# Patient Record
Sex: MALE | Race: WHITE | URBAN - METROPOLITAN AREA
[De-identification: names, ages, dates, MRNs, and addresses within clinical notes are randomized per-mention and may not be internally consistent; named-entity substitution may affect disease eponyms.]

---

## 2021-07-16 ENCOUNTER — EMERGENCY (EMERGENCY)
Facility: HOSPITAL | Age: 66
LOS: 0 days | Discharge: HOME | End: 2021-07-16
Attending: EMERGENCY MEDICINE | Admitting: EMERGENCY MEDICINE
Payer: MEDICARE

## 2021-07-16 VITALS
DIASTOLIC BLOOD PRESSURE: 83 MMHG | RESPIRATION RATE: 18 BRPM | HEIGHT: 66 IN | WEIGHT: 220.02 LBS | OXYGEN SATURATION: 94 % | HEART RATE: 106 BPM | TEMPERATURE: 101 F | SYSTOLIC BLOOD PRESSURE: 164 MMHG

## 2021-07-16 VITALS — HEART RATE: 88 BPM

## 2021-07-16 DIAGNOSIS — R50.9 FEVER, UNSPECIFIED: ICD-10-CM
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
## 2021-07-16 DIAGNOSIS — T50.B95A ADVERSE EFFECT OF OTHER VIRAL VACCINES, INITIAL ENCOUNTER: ICD-10-CM

## 2021-07-16 DIAGNOSIS — Z87.891 PERSONAL HISTORY OF NICOTINE DEPENDENCE: ICD-10-CM

## 2021-07-16 DIAGNOSIS — J44.9 CHRONIC OBSTRUCTIVE PULMONARY DISEASE, UNSPECIFIED: ICD-10-CM
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
## 2021-07-16 DIAGNOSIS — Z20.822 CONTACT WITH AND (SUSPECTED) EXPOSURE TO COVID-19: ICD-10-CM

## 2021-07-16 LAB — SARS-COV-2 RNA SPEC QL NAA+PROBE: SIGNIFICANT CHANGE UP

## 2021-07-16 PROCEDURE — 99284 EMERGENCY DEPT VISIT MOD MDM: CPT

## 2021-07-16 RX ORDER — ACETAMINOPHEN 500 MG
650 TABLET ORAL ONCE
Refills: 0 | Status: COMPLETED | OUTPATIENT
Start: 2021-07-16 | End: 2021-07-16

## 2021-07-16 RX ADMIN — Medication 650 MILLIGRAM(S): at 05:09

## 2021-07-16 NOTE — ED PROVIDER NOTE - NS ED ROS FT
Constitutional:  (-) fever, (+) chills, (-) lethargy  Eyes:  (-) eye pain (-) visual changes  ENMT: (-) nasal discharge, (-) sore throat. (-) neck pain or stiffness  Cardiac: (-) chest pain (-) palpitations  Respiratory:  (-) cough (-) respiratory distress.   GI:  (-) nausea (-) vomiting (-) diarrhea (-) abdominal pain.  :  (-) dysuria (-) frequency (-) burning.  MS:  (-) back pain (-) joint pain.  Neuro:  (-) headache (-) numbness (-) tingling (-) focal weakness  Skin:  (-) rash  Except as documented in the HPI,  all other systems are negative

## 2021-07-16 NOTE — ED PROVIDER NOTE - OBJECTIVE STATEMENT
67 yo M with PMH of COPD on 2L home O2 presenting for chills. Patient had second pfizer vaccine yesterday and says he has felt very cold since. Has not taken temperature at home. Denies any associated symptoms including headache, vision changes, dizziness, cough, congestion, sore throat, chest pain, shortness of breath, nausea, vomiting, diarrhea, dysuria, hematuria, melena, hematochezia.

## 2021-07-16 NOTE — ED PROVIDER NOTE - PATIENT PORTAL LINK FT
You can access the FollowMyHealth Patient Portal offered by [facility] by registering at the following website: [email or web address]. By joining [employer]’s FollowMyHealth portal, you will also be able to view your health information using other applications (apps) compatible with our system.

## 2021-07-16 NOTE — ED PROVIDER NOTE - NSFOLLOWUPINSTRUCTIONS_ED_ALL_ED_FT
Take tylenol every 6 horus for fever. Drink plenty of fluids. Follow up with your docotr. Return to ED for chest pain, trouble breathing, or new concerns.

## 2021-07-16 NOTE — ED PROVIDER NOTE - ATTENDING CONTRIBUTION TO CARE
vaccine side effect ambulating about ED without difficulty will d/c supportive care. Patient counseled regarding conditions which should prompt return.

## 2022-11-09 NOTE — ED ADULT NURSE NOTE - NS ED NURSE RECORD ANOTHER HT AND WT
Graft Donor Site Bandage (Optional-Leave Blank If You Don't Want In Note): Steri-strips and a pressure bandage were applied to the donor site. Yes

## 2024-03-13 ENCOUNTER — EMERGENCY (EMERGENCY)
Facility: HOSPITAL | Age: 69
LOS: 0 days | Discharge: LEFT BEFORE DISCHARGE | End: 2024-03-13
Attending: EMERGENCY MEDICINE
Payer: MEDICARE

## 2024-03-13 VITALS
WEIGHT: 235.01 LBS | TEMPERATURE: 98 F | DIASTOLIC BLOOD PRESSURE: 66 MMHG | SYSTOLIC BLOOD PRESSURE: 145 MMHG | RESPIRATION RATE: 24 BRPM | HEART RATE: 80 BPM | OXYGEN SATURATION: 89 %

## 2024-03-13 DIAGNOSIS — R14.0 ABDOMINAL DISTENSION (GASEOUS): ICD-10-CM

## 2024-03-13 DIAGNOSIS — R05.8 OTHER SPECIFIED COUGH: ICD-10-CM

## 2024-03-13 DIAGNOSIS — E78.5 HYPERLIPIDEMIA, UNSPECIFIED: ICD-10-CM

## 2024-03-13 DIAGNOSIS — Z20.822 CONTACT WITH AND (SUSPECTED) EXPOSURE TO COVID-19: ICD-10-CM

## 2024-03-13 DIAGNOSIS — R10.9 UNSPECIFIED ABDOMINAL PAIN: ICD-10-CM

## 2024-03-13 DIAGNOSIS — Z53.29 PROCEDURE AND TREATMENT NOT CARRIED OUT BECAUSE OF PATIENT'S DECISION FOR OTHER REASONS: ICD-10-CM

## 2024-03-13 DIAGNOSIS — R09.02 HYPOXEMIA: ICD-10-CM

## 2024-03-13 DIAGNOSIS — Z87.891 PERSONAL HISTORY OF NICOTINE DEPENDENCE: ICD-10-CM

## 2024-03-13 DIAGNOSIS — J44.9 CHRONIC OBSTRUCTIVE PULMONARY DISEASE, UNSPECIFIED: ICD-10-CM

## 2024-03-13 DIAGNOSIS — I10 ESSENTIAL (PRIMARY) HYPERTENSION: ICD-10-CM

## 2024-03-13 DIAGNOSIS — R06.02 SHORTNESS OF BREATH: ICD-10-CM

## 2024-03-13 DIAGNOSIS — R06.00 DYSPNEA, UNSPECIFIED: ICD-10-CM

## 2024-03-13 DIAGNOSIS — R06.2 WHEEZING: ICD-10-CM

## 2024-03-13 DIAGNOSIS — Z99.81 DEPENDENCE ON SUPPLEMENTAL OXYGEN: ICD-10-CM

## 2024-03-13 LAB
ALBUMIN SERPL ELPH-MCNC: 3.8 G/DL — SIGNIFICANT CHANGE UP (ref 3.5–5.2)
ALP SERPL-CCNC: 58 U/L — SIGNIFICANT CHANGE UP (ref 30–115)
ALT FLD-CCNC: 15 U/L — SIGNIFICANT CHANGE UP (ref 0–41)
ANION GAP SERPL CALC-SCNC: 5 MMOL/L — LOW (ref 7–14)
AST SERPL-CCNC: 16 U/L — SIGNIFICANT CHANGE UP (ref 0–41)
BASE EXCESS BLDV CALC-SCNC: 15.1 MMOL/L — HIGH (ref -2–3)
BASOPHILS # BLD AUTO: 0.06 K/UL — SIGNIFICANT CHANGE UP (ref 0–0.2)
BASOPHILS NFR BLD AUTO: 0.6 % — SIGNIFICANT CHANGE UP (ref 0–1)
BILIRUB SERPL-MCNC: 0.3 MG/DL — SIGNIFICANT CHANGE UP (ref 0.2–1.2)
BUN SERPL-MCNC: 18 MG/DL — SIGNIFICANT CHANGE UP (ref 10–20)
CA-I SERPL-SCNC: 1.15 MMOL/L — SIGNIFICANT CHANGE UP (ref 1.15–1.33)
CALCIUM SERPL-MCNC: 9.1 MG/DL — SIGNIFICANT CHANGE UP (ref 8.4–10.5)
CHLORIDE SERPL-SCNC: 96 MMOL/L — LOW (ref 98–110)
CO2 SERPL-SCNC: 38 MMOL/L — HIGH (ref 17–32)
CREAT SERPL-MCNC: 0.8 MG/DL — SIGNIFICANT CHANGE UP (ref 0.7–1.5)
EGFR: 95 ML/MIN/1.73M2 — SIGNIFICANT CHANGE UP
EOSINOPHIL # BLD AUTO: 0.69 K/UL — SIGNIFICANT CHANGE UP (ref 0–0.7)
EOSINOPHIL NFR BLD AUTO: 6.6 % — SIGNIFICANT CHANGE UP (ref 0–8)
FLUAV AG NPH QL: SIGNIFICANT CHANGE UP
FLUBV AG NPH QL: SIGNIFICANT CHANGE UP
GAS PNL BLDV: 138 MMOL/L — SIGNIFICANT CHANGE UP (ref 136–145)
GAS PNL BLDV: SIGNIFICANT CHANGE UP
GLUCOSE SERPL-MCNC: 109 MG/DL — HIGH (ref 70–99)
HCO3 BLDV-SCNC: 44 MMOL/L — HIGH (ref 22–29)
HCT VFR BLD CALC: 41.7 % — LOW (ref 42–52)
HCT VFR BLDA CALC: 44 % — SIGNIFICANT CHANGE UP (ref 39–51)
HGB BLD CALC-MCNC: 14.8 G/DL — SIGNIFICANT CHANGE UP (ref 12.6–17.4)
HGB BLD-MCNC: 14.4 G/DL — SIGNIFICANT CHANGE UP (ref 14–18)
IMM GRANULOCYTES NFR BLD AUTO: 0.5 % — HIGH (ref 0.1–0.3)
LACTATE BLDV-MCNC: 1.4 MMOL/L — SIGNIFICANT CHANGE UP (ref 0.5–2)
LYMPHOCYTES # BLD AUTO: 2.4 K/UL — SIGNIFICANT CHANGE UP (ref 1.2–3.4)
LYMPHOCYTES # BLD AUTO: 22.8 % — SIGNIFICANT CHANGE UP (ref 20.5–51.1)
MCHC RBC-ENTMCNC: 30.3 PG — SIGNIFICANT CHANGE UP (ref 27–31)
MCHC RBC-ENTMCNC: 34.5 G/DL — SIGNIFICANT CHANGE UP (ref 32–37)
MCV RBC AUTO: 87.8 FL — SIGNIFICANT CHANGE UP (ref 80–94)
MONOCYTES # BLD AUTO: 1.02 K/UL — HIGH (ref 0.1–0.6)
MONOCYTES NFR BLD AUTO: 9.7 % — HIGH (ref 1.7–9.3)
NEUTROPHILS # BLD AUTO: 6.31 K/UL — SIGNIFICANT CHANGE UP (ref 1.4–6.5)
NEUTROPHILS NFR BLD AUTO: 59.8 % — SIGNIFICANT CHANGE UP (ref 42.2–75.2)
NRBC # BLD: 0 /100 WBCS — SIGNIFICANT CHANGE UP (ref 0–0)
NT-PROBNP SERPL-SCNC: 57 PG/ML — SIGNIFICANT CHANGE UP (ref 0–300)
PCO2 BLDV: 75 MMHG — HIGH (ref 42–55)
PH BLDV: 7.38 — SIGNIFICANT CHANGE UP (ref 7.32–7.43)
PLATELET # BLD AUTO: 204 K/UL — SIGNIFICANT CHANGE UP (ref 130–400)
PMV BLD: 10 FL — SIGNIFICANT CHANGE UP (ref 7.4–10.4)
PO2 BLDV: 28 MMHG — SIGNIFICANT CHANGE UP (ref 25–45)
POTASSIUM BLDV-SCNC: 3.8 MMOL/L — SIGNIFICANT CHANGE UP (ref 3.5–5.1)
POTASSIUM SERPL-MCNC: 3.9 MMOL/L — SIGNIFICANT CHANGE UP (ref 3.5–5)
POTASSIUM SERPL-SCNC: 3.9 MMOL/L — SIGNIFICANT CHANGE UP (ref 3.5–5)
PROT SERPL-MCNC: 6 G/DL — SIGNIFICANT CHANGE UP (ref 6–8)
RBC # BLD: 4.75 M/UL — SIGNIFICANT CHANGE UP (ref 4.7–6.1)
RBC # FLD: 12.3 % — SIGNIFICANT CHANGE UP (ref 11.5–14.5)
RSV RNA NPH QL NAA+NON-PROBE: SIGNIFICANT CHANGE UP
SAO2 % BLDV: 50.7 % — LOW (ref 67–88)
SARS-COV-2 RNA SPEC QL NAA+PROBE: SIGNIFICANT CHANGE UP
SODIUM SERPL-SCNC: 139 MMOL/L — SIGNIFICANT CHANGE UP (ref 135–146)
TROPONIN SAMPLING TIME: SIGNIFICANT CHANGE UP
TROPONIN SAMPLING TIME: SIGNIFICANT CHANGE UP
TROPONIN T, HIGH SENSITIVITY RESULT: 7 NG/L — SIGNIFICANT CHANGE UP (ref 6–21)
TROPONIN T, HIGH SENSITIVITY RESULT: 8 NG/L — SIGNIFICANT CHANGE UP (ref 6–21)
WBC # BLD: 10.72 K/UL — SIGNIFICANT CHANGE UP (ref 4.8–10.8)
WBC # FLD AUTO: 10.72 K/UL — SIGNIFICANT CHANGE UP (ref 4.8–10.8)

## 2024-03-13 PROCEDURE — 82803 BLOOD GASES ANY COMBINATION: CPT

## 2024-03-13 PROCEDURE — 94640 AIRWAY INHALATION TREATMENT: CPT

## 2024-03-13 PROCEDURE — 85018 HEMOGLOBIN: CPT

## 2024-03-13 PROCEDURE — 82330 ASSAY OF CALCIUM: CPT

## 2024-03-13 PROCEDURE — 96374 THER/PROPH/DIAG INJ IV PUSH: CPT

## 2024-03-13 PROCEDURE — 83880 ASSAY OF NATRIURETIC PEPTIDE: CPT

## 2024-03-13 PROCEDURE — 83605 ASSAY OF LACTIC ACID: CPT

## 2024-03-13 PROCEDURE — 99285 EMERGENCY DEPT VISIT HI MDM: CPT

## 2024-03-13 PROCEDURE — 71046 X-RAY EXAM CHEST 2 VIEWS: CPT

## 2024-03-13 PROCEDURE — 93010 ELECTROCARDIOGRAM REPORT: CPT

## 2024-03-13 PROCEDURE — 84132 ASSAY OF SERUM POTASSIUM: CPT

## 2024-03-13 PROCEDURE — 0241U: CPT

## 2024-03-13 PROCEDURE — 99285 EMERGENCY DEPT VISIT HI MDM: CPT | Mod: 25

## 2024-03-13 PROCEDURE — 36415 COLL VENOUS BLD VENIPUNCTURE: CPT

## 2024-03-13 PROCEDURE — 71046 X-RAY EXAM CHEST 2 VIEWS: CPT | Mod: 26

## 2024-03-13 PROCEDURE — 84484 ASSAY OF TROPONIN QUANT: CPT

## 2024-03-13 PROCEDURE — 84295 ASSAY OF SERUM SODIUM: CPT

## 2024-03-13 PROCEDURE — 93005 ELECTROCARDIOGRAM TRACING: CPT

## 2024-03-13 PROCEDURE — 85014 HEMATOCRIT: CPT

## 2024-03-13 PROCEDURE — 85025 COMPLETE CBC W/AUTO DIFF WBC: CPT

## 2024-03-13 PROCEDURE — 80053 COMPREHEN METABOLIC PANEL: CPT

## 2024-03-13 RX ORDER — ALBUTEROL 90 UG/1
2.5 AEROSOL, METERED ORAL ONCE
Refills: 0 | Status: COMPLETED | OUTPATIENT
Start: 2024-03-13 | End: 2024-03-13

## 2024-03-13 RX ORDER — IPRATROPIUM/ALBUTEROL SULFATE 18-103MCG
3 AEROSOL WITH ADAPTER (GRAM) INHALATION
Refills: 0 | Status: COMPLETED | OUTPATIENT
Start: 2024-03-13 | End: 2024-03-13

## 2024-03-13 RX ORDER — ALBUTEROL 90 UG/1
3 AEROSOL, METERED ORAL
Qty: 12 | Refills: 0
Start: 2024-03-13 | End: 2024-03-22

## 2024-03-13 RX ADMIN — Medication 3 MILLILITER(S): at 13:49

## 2024-03-13 RX ADMIN — Medication 3 MILLILITER(S): at 15:56

## 2024-03-13 RX ADMIN — ALBUTEROL 2.5 MILLIGRAM(S): 90 AEROSOL, METERED ORAL at 15:57

## 2024-03-13 RX ADMIN — Medication 125 MILLIGRAM(S): at 13:49

## 2024-03-13 RX ADMIN — Medication 3 MILLILITER(S): at 14:26

## 2024-03-13 NOTE — ED PROVIDER NOTE - OBJECTIVE STATEMENT
Patient is a 69-year-old male past history of COPD on oxygen at night, hypertension, hyperlipidemia, here for evaluation of worsening dyspnea on exertion over the past couple days with associated dry cough.  Patient denies chest pain, worsening leg swelling, fever, chills.

## 2024-03-13 NOTE — ED PROVIDER NOTE - ATTENDING APP SHARED VISIT CONTRIBUTION OF CARE
70 yo M h/o COPD, uses oxygen at night presents for evaluation of increasing shortness of breath over the last few days with cough, no fever, no CP, Pt also mentioned abd bloating and discomfort, on exam pt in NAD AAO x 3, diffuse wheezing, no retractions, no edema, abd soft nt nd

## 2024-03-13 NOTE — ED PROVIDER NOTE - CLINICAL SUMMARY MEDICAL DECISION MAKING FREE TEXT BOX
Pt placed on oxygen and treated with nebs with improvement.  Pt was unable to tolerate CT scan.  Rec admission, however pt refuses to stay.  will AMA. will cont treatment as outot.  Pt has O2 at home. Pt instructed to return if any worsening symptoms or concerns.  They verbalize understanding.

## 2024-03-13 NOTE — ED PROVIDER NOTE - PATIENT PORTAL LINK FT
You can access the FollowMyHealth Patient Portal offered by [facility] by registering at the following website: [email or web address]. By joining [employer]’s FollowMyHealth portal, you will also be able to view your health information using other applications (apps) compatible with our system.

## 2024-03-13 NOTE — ED PROVIDER NOTE - CARE PROVIDER_API CALL
Ceferino Gallegos  Internal Medicine  [address] [place] [address]  Phone: [phone]  Fax: [phone]  Follow Up Time:

## 2024-03-13 NOTE — ED PROVIDER NOTE - PHYSICAL EXAMINATION
VITAL SIGNS: I have reviewed nursing notes and confirm.  CONSTITUTIONAL: Well-developed; well-nourished  SKIN:  skin exam is warm and dry, no acute rash.    HEAD: Normocephalic; atraumatic.  EYES: conjunctiva and sclera clear.  ENT: No nasal discharge; airway clear.  CARD: S1, S2 normal; no murmurs, gallops, or rubs. Regular rate and rhythm.   RESP:diffuse wheezing  ABD: Normal bowel sounds; soft; non-distended; non-tender  EXT: Normal ROM.  No clubbing, cyanosis or edema.   NEURO: Alert, oriented, grossly unremarkable

## 2024-03-13 NOTE — ED PROVIDER NOTE - PROGRESS NOTE DETAILS
Patient found to have a pCO2 of 75 bicarb of 44 with hypoxia on initial presentation at 89% on room air, plan is to admit but patient refused and rather see his primary doctor tomorrow.  Patient will sign out AGAINST MEDICAL ADVICE

## 2025-06-01 ENCOUNTER — EMERGENCY (EMERGENCY)
Facility: HOSPITAL | Age: 70
LOS: 0 days | Discharge: HOME | End: 2025-06-01
Attending: EMERGENCY MEDICINE
Payer: MEDICARE

## 2025-06-01 VITALS
TEMPERATURE: 98 F | DIASTOLIC BLOOD PRESSURE: 84 MMHG | SYSTOLIC BLOOD PRESSURE: 129 MMHG | HEART RATE: 60 BPM | HEIGHT: 65 IN | RESPIRATION RATE: 18 BRPM | WEIGHT: 225.09 LBS | OXYGEN SATURATION: 96 %

## 2025-06-01 DIAGNOSIS — M54.50 LOW BACK PAIN, UNSPECIFIED: ICD-10-CM

## 2025-06-01 PROCEDURE — 96375 TX/PRO/DX INJ NEW DRUG ADDON: CPT

## 2025-06-01 PROCEDURE — 96374 THER/PROPH/DIAG INJ IV PUSH: CPT

## 2025-06-01 PROCEDURE — 99284 EMERGENCY DEPT VISIT MOD MDM: CPT

## 2025-06-01 PROCEDURE — 99284 EMERGENCY DEPT VISIT MOD MDM: CPT | Mod: 25

## 2025-06-01 RX ORDER — OXYCODONE HYDROCHLORIDE AND ACETAMINOPHEN 10; 325 MG/1; MG/1
1 TABLET ORAL
Qty: 9 | Refills: 0
Start: 2025-06-01 | End: 2025-06-03

## 2025-06-01 RX ORDER — KETOROLAC TROMETHAMINE 30 MG/ML
15 INJECTION, SOLUTION INTRAMUSCULAR; INTRAVENOUS ONCE
Refills: 0 | Status: DISCONTINUED | OUTPATIENT
Start: 2025-06-01 | End: 2025-06-01

## 2025-06-01 RX ORDER — DEXAMETHASONE 0.5 MG/1
6 TABLET ORAL ONCE
Refills: 0 | Status: COMPLETED | OUTPATIENT
Start: 2025-06-01 | End: 2025-06-01

## 2025-06-01 RX ADMIN — DEXAMETHASONE 6 MILLIGRAM(S): 0.5 TABLET ORAL at 09:56

## 2025-06-01 RX ADMIN — Medication 4 MILLIGRAM(S): at 13:00

## 2025-06-01 RX ADMIN — KETOROLAC TROMETHAMINE 15 MILLIGRAM(S): 30 INJECTION, SOLUTION INTRAMUSCULAR; INTRAVENOUS at 09:56

## 2025-06-01 NOTE — ED PROVIDER NOTE - CLINICAL SUMMARY MEDICAL DECISION MAKING FREE TEXT BOX
Patient is a 70-year-old male presents for evaluation of lumbar sacral pain described as sharp stabbing in nature with radiation down the posterior aspect of his left leg onset over the past several weeks worse over the past several days patient was seen by his PMD had a local injection which initially helped but patient had return of symptoms he denies any loss of bladder or bowel control denies any saddle anesthesia denies any fevers chills history of IVDA denies other abdominal pain back pain chest pain shortness of breath or diaphoresis    On physical exam patient is normocephalic/atraumatic pupils equal round react light accommodation extraocular muscles intact oropharynx clear chest clear to auscultation bilaterally abdomen soft nontender nondistended bowel sounds positive no guarding or rebound patient has pain to palpation of the sciatic notch he is able to stand and ambulate pedal pulse 2+ radial pulse 2+ no focal deficits noted    Assessment plan patient presents for evaluation of left lumbar sacral pain does not consistent with cauda equina and is not consistent with spinal epidural abscess patient has no loss of bladder bowel control no saddle anesthesia no weakness no fevers no chills patient given both IAB pain medicine as well as p.o. improved here in the emergency department offered admission offered imaging studies however patient prefers to follow-up with primary care doctor for advanced imaging we discussed indications to return patient is aware

## 2025-06-01 NOTE — ED PROVIDER NOTE - OBJECTIVE STATEMENT
I have personally performed a history and physical exam on this patient and personally directed the management of the patient. Patient is a 70-year-old male presents for evaluation of lumbar sacral pain described as sharp stabbing in nature with radiation down the posterior aspect of his left leg onset over the past several weeks worse over the past several days patient was seen by his PMD had a local injection which initially helped but patient had return of symptoms he denies any loss of bladder or bowel control denies any saddle anesthesia denies any fevers chills history of IVDA denies other abdominal pain back pain chest pain shortness of breath or diaphoresis

## 2025-06-01 NOTE — ED PROVIDER NOTE - NSFOLLOWUPINSTRUCTIONS_ED_ALL_ED_FT
SEE YOUR DOCTOR IN THE NEXT 24-48 HOURS   RETURN FOR ANY FURTHER CONCERNS     Back Pain    Back pain is very common in adults. The cause of back pain is rarely dangerous and the pain often gets better over time. The cause of your back pain may not be known and may include strain of muscles or ligaments, degeneration of the spinal disks, or arthritis. Occasionally the pain may radiate down your leg(s). Over-the-counter medicines to reduce pain and inflammation are often the most helpful. Stretching and remaining active frequently helps the healing process.     SEEK IMMEDIATE MEDICAL CARE IF YOU HAVE ANY OF THE FOLLOWING SYMPTOMS: bowel or bladder control problems, unusual weakness or numbness in your arms or legs, nausea or vomiting, abdominal pain, fever, dizziness/lightheadedness.

## 2025-06-01 NOTE — ED PROVIDER NOTE - PATIENT PORTAL LINK FT
You can access the FollowMyHealth Patient Portal offered by [facility] by registering at the following website: [email or web address]. By joining [employer]’s FollowMyHealth portal, you will also be able to view your health information using other applications (apps) compatible with our system.

## 2025-06-01 NOTE — ED PROVIDER NOTE - PHYSICAL EXAMINATION
On physical exam patient is normocephalic/atraumatic pupils equal round react light accommodation extraocular muscles intact oropharynx clear chest clear to auscultation bilaterally abdomen soft nontender nondistended bowel sounds positive no guarding or rebound patient has pain to palpation of the sciatic notch he is able to stand and ambulate pedal pulse 2+ radial pulse 2+ no focal deficits noted

## 2025-06-01 NOTE — ED ADULT NURSE NOTE - SUICIDE SCREENING QUESTION 3
April 26, 2021      To Whom It May Concern:      Mayito Galaviz was seen in our Emergency Department today, 04/26/21. Please excuse him from work from 4/26-4/28. Further notes per primary care doctor. I expect his condition to improve over the next 5-7 days.  He may return to work/school when improved.    Sincerely,        Ksenia Magallon PA-C        
No

## 2025-06-01 NOTE — ED PROVIDER NOTE - ATTENDING APP SHARED VISIT CONTRIBUTION OF CARE
I have personally performed a history and physical exam on this patient and personally directed the management of the patient. Patient is a 70-year-old male presents for evaluation of lumbar sacral pain described as sharp stabbing in nature with radiation down the posterior aspect of his left leg onset over the past several weeks worse over the past several days patient was seen by his PMD had a local injection which initially helped but patient had return of symptoms he denies any loss of bladder or bowel control denies any saddle anesthesia denies any fevers chills history of IVDA denies other abdominal pain back pain chest pain shortness of breath or diaphoresis    On physical exam patient is normocephalic/atraumatic pupils equal round react light accommodation extraocular muscles intact oropharynx clear chest clear to auscultation bilaterally abdomen soft nontender nondistended bowel sounds positive no guarding or rebound patient has pain to palpation of the sciatic notch he is able to stand and ambulate pedal pulse 2+ radial pulse 2+ no focal deficits noted    Assessment plan patient presents for evaluation of left lumbar sacral pain does not consistent with cauda equina and is not consistent with spinal epidural abscess patient has no loss of bladder bowel control no saddle anesthesia no weakness no fevers no chills patient given both IAB pain medicine as well as p.o. improved here in the emergency department offered admission offered imaging studies however patient prefers to follow-up with primary care doctor for advanced imaging we discussed indications to return patient is aware I have personally performed a history and physical exam on this patient and personally directed the management of the patient. Patient is a 70-year-old male presents for evaluation of lumbar sacral pain described as sharp stabbing in nature with radiation down the posterior aspect of his left leg onset over the past several weeks worse over the past several days patient was seen by his PMD had a local injection which initially helped but patient had return of symptoms he denies any loss of bladder or bowel control denies any saddle anesthesia denies any fevers chills history of IVDA denies other abdominal pain back pain chest pain shortness of breath or diaphoresis    On physical exam patient is normocephalic/atraumatic pupils equal round react light accommodation extraocular muscles intact oropharynx clear chest clear to auscultation bilaterally abdomen soft nontender nondistended bowel sounds positive no guarding or rebound patient has pain to palpation of the sciatic notch he is able to stand and ambulate pedal pulse 2+ radial pulse 2+ no focal deficits noted    Assessment plan patient presents for evaluation of left lumbar sacral pain does not consistent with cauda equina and is not consistent with spinal epidural abscess patient has no loss of bladder bowel control no saddle anesthesia no weakness no fevers no chills patient given both IAB pain medicine as well as p.o. improved here in the emergency department offered admission offered imaging studies however patient prefers to follow-up with primary care doctor for advanced imaging we discussed indications to return patient is aware.